# Patient Record
Sex: MALE | Race: OTHER | Employment: OTHER | URBAN - NONMETROPOLITAN AREA
[De-identification: names, ages, dates, MRNs, and addresses within clinical notes are randomized per-mention and may not be internally consistent; named-entity substitution may affect disease eponyms.]

---

## 2021-12-22 ENCOUNTER — NEW PATIENT (OUTPATIENT)
Dept: URBAN - NONMETROPOLITAN AREA CLINIC 6 | Facility: CLINIC | Age: 31
End: 2021-12-22

## 2021-12-22 DIAGNOSIS — H57.11: ICD-10-CM

## 2021-12-22 DIAGNOSIS — T15.01XA: ICD-10-CM

## 2021-12-22 PROCEDURE — 99203 OFFICE O/P NEW LOW 30 MIN: CPT

## 2021-12-22 PROCEDURE — 65222 REMOVE FOREIGN BODY FROM EYE: CPT

## 2021-12-22 RX ORDER — ERYTHROMYCIN 5 MG/G: OINTMENT OPHTHALMIC EVERY EVENING

## 2021-12-22 RX ORDER — MOXIFLOXACIN OPHTHALMIC 5 MG/ML: 1 SOLUTION/ DROPS OPHTHALMIC

## 2021-12-22 ASSESSMENT — VISUAL ACUITY
OS_SC: 20/20-1
OD_SC: 20/20-1

## 2021-12-22 NOTE — PROCEDURE NOTE: CLINICAL
PROCEDURE NOTE: Removal of Corneal FB at Slit Lamp OD. Diagnosis: Embedded Corneal Foreign Body. Anesthesia: Topical. The patient, the procedure, and the correct site were identified initially. After the risks, benefits, and alternatives to the procedure were explained to the patient, informed consent was obtained. Prior to treatment, the risks/benefits/alternatives were discussed. The patient wished to proceed with procedure. 1gtt ofloxacin instilled before and after. Corneal foreign body was removed using a spud at the slit lamp. Patient tolerated procedure well. There were no complications. Post-op instructions given. Lucinda Kam

## 2022-01-06 ENCOUNTER — ESTABLISHED PATIENT (OUTPATIENT)
Dept: URBAN - NONMETROPOLITAN AREA CLINIC 6 | Facility: CLINIC | Age: 32
End: 2022-01-06

## 2022-01-06 DIAGNOSIS — H17.9: ICD-10-CM

## 2022-01-06 DIAGNOSIS — H04.123: ICD-10-CM

## 2022-01-06 PROCEDURE — 92014 COMPRE OPH EXAM EST PT 1/>: CPT

## 2022-01-06 ASSESSMENT — KERATOMETRY
OD_AXISANGLE_DEGREES: 171
OS_K2POWER_DIOPTERS: 42.25
OD_K1POWER_DIOPTERS: 41.50
OD_K2POWER_DIOPTERS: 42.25
OS_AXISANGLE_DEGREES: 33
OD_AXISANGLE2_DEGREES: 81
OS_K1POWER_DIOPTERS: 41.75
OS_AXISANGLE2_DEGREES: 123

## 2022-01-06 ASSESSMENT — VISUAL ACUITY
OD_SC: 20/20
OS_SC: 20/20

## 2022-01-06 ASSESSMENT — TONOMETRY
OS_IOP_MMHG: 19
OD_IOP_MMHG: 19